# Patient Record
Sex: MALE | Race: WHITE | NOT HISPANIC OR LATINO | Employment: FULL TIME | URBAN - METROPOLITAN AREA
[De-identification: names, ages, dates, MRNs, and addresses within clinical notes are randomized per-mention and may not be internally consistent; named-entity substitution may affect disease eponyms.]

---

## 2018-09-18 ENCOUNTER — TRANSCRIBE ORDERS (OUTPATIENT)
Dept: ADMINISTRATIVE | Facility: HOSPITAL | Age: 23
End: 2018-09-18

## 2018-09-18 DIAGNOSIS — K76.9 LESION OF RIGHT LOBE OF LIVER: Primary | ICD-10-CM

## 2018-10-01 ENCOUNTER — HOSPITAL ENCOUNTER (OUTPATIENT)
Dept: MRI IMAGING | Facility: HOSPITAL | Age: 23
Discharge: HOME | End: 2018-10-01
Admitting: TRANSPLANT SURGERY

## 2018-10-01 DIAGNOSIS — K76.9 LESION OF RIGHT LOBE OF LIVER: ICD-10-CM

## 2018-10-01 PROCEDURE — A9577 INJ MULTIHANCE: HCPCS | Performed by: TRANSPLANT SURGERY

## 2018-10-01 PROCEDURE — 0 GADOBENATE DIMEGLUMINE 529 MG/ML SOLUTION: Performed by: TRANSPLANT SURGERY

## 2018-10-01 PROCEDURE — 74183 MRI ABD W/O CNTR FLWD CNTR: CPT

## 2018-10-01 RX ADMIN — GADOBENATE DIMEGLUMINE 13 ML: 529 INJECTION, SOLUTION INTRAVENOUS at 10:17

## 2020-12-04 ENCOUNTER — TELEPHONE (OUTPATIENT)
Dept: OTOLARYNGOLOGY | Facility: CLINIC | Age: 25
End: 2020-12-04

## 2021-02-03 ENCOUNTER — OFFICE VISIT (OUTPATIENT)
Dept: OTOLARYNGOLOGY | Facility: CLINIC | Age: 26
End: 2021-02-03

## 2021-02-03 VITALS
HEART RATE: 81 BPM | WEIGHT: 255 LBS | DIASTOLIC BLOOD PRESSURE: 83 MMHG | TEMPERATURE: 97.7 F | SYSTOLIC BLOOD PRESSURE: 149 MMHG

## 2021-02-03 DIAGNOSIS — R09.82 POST-NASAL DRAINAGE: ICD-10-CM

## 2021-02-03 DIAGNOSIS — J30.89 NON-SEASONAL ALLERGIC RHINITIS DUE TO OTHER ALLERGIC TRIGGER: Primary | ICD-10-CM

## 2021-02-03 DIAGNOSIS — J34.3 NASAL TURBINATE HYPERTROPHY: ICD-10-CM

## 2021-02-03 DIAGNOSIS — K21.9 GASTROESOPHAGEAL REFLUX DISEASE WITHOUT ESOPHAGITIS: ICD-10-CM

## 2021-02-03 DIAGNOSIS — J34.2 ACQUIRED DEVIATED NASAL SEPTUM: ICD-10-CM

## 2021-02-03 PROCEDURE — 99204 OFFICE O/P NEW MOD 45 MIN: CPT | Performed by: OTOLARYNGOLOGY

## 2021-02-03 RX ORDER — MONTELUKAST SODIUM 10 MG/1
10 TABLET ORAL NIGHTLY
COMMUNITY

## 2021-02-03 RX ORDER — LORATADINE 10 MG/1
10 TABLET ORAL DAILY
COMMUNITY

## 2021-02-03 RX ORDER — OXYMETAZOLINE HYDROCHLORIDE 0.05 G/100ML
2 SPRAY NASAL 3 TIMES DAILY
Qty: 2 ML | Refills: 0 | Status: SHIPPED | OUTPATIENT
Start: 2021-02-03 | End: 2021-02-06

## 2021-02-03 RX ORDER — TRIAMCINOLONE ACETONIDE 55 UG/1
2 SPRAY, METERED NASAL 2 TIMES DAILY
Qty: 33.8 ML | Refills: 3 | Status: SHIPPED | OUTPATIENT
Start: 2021-02-03

## 2021-02-03 RX ORDER — PREDNISONE 10 MG/1
10 TABLET ORAL EVERY OTHER DAY
Qty: 30 TABLET | Refills: 1 | Status: SHIPPED | OUTPATIENT
Start: 2021-02-03

## 2021-02-03 NOTE — PROGRESS NOTES
[facility] [place] [place] [facility] [place]    Chief Complaint   Patient presents with   • Follow-up     ALLERGIC RHINITIS          HPI   Accompanied by:  No One  Mamadou KATIE Oviedo is a  25 y.o.  male who complains of thick post nasal drip. The symptoms are localized to the throat. The patient has had mild to moderate symptoms. The symptoms have been relatively constant for the last several years. The symptoms are aggravated by  drinking juice and anxiety. There has been mild improvement with singulair and claritin.  He has tried flonase and nasal antihistamines in the past which have not been helpful and caused epistaxis.  He denies any facial pressure, ear pressure, globus sensation or headaches.   He states symptoms present for 6 yrs. Allergy tested 2016. No shots.  Nasal steroids make him bleed.  Oral steroids- not help.  Medications- Nasal steroids, nasal antihistamine.  Worst symptom- feels like thick film in back of throat.  No trouble breathing.  EDUARDO- occasionally.  Smoke- none  Drink- occasionally.  Sinus infection- says he gets a sore throat, nose stopped up. Treated for sinus infection.      History     Last Reviewed by Kurt Barker Jr., MD on 2/3/2021 at  2:25 PM    Sections Reviewed    Medical, Family, Tobacco, Surgical      Problem list reviewed by Kurt Barker Jr., MD on 2/3/2021 at  2:25 PM  Medicines reviewed by Kurt Barker Jr., MD on 2/3/2021 at  2:25 PM  Allergies reviewed by Kurt Barker Jr., MD on 2/3/2021 at  2:25 PM         Vital Signs:   Temp:  [97.7 °F (36.5 °C)] 97.7 °F (36.5 °C)  Heart Rate:  [81] 81  BP: (149)/(83) 149/83    Physical Exam  HENT:      Head: Normocephalic.      Right Ear: Hearing, tympanic membrane, ear canal and external ear normal.      Left Ear: Hearing, tympanic membrane, ear canal and external ear normal.      Nose: Septal deviation (right superior), mucosal edema and congestion present.      Mouth/Throat:      Lips:  Pink.      Mouth: Mucous membranes are moist. No oral lesions.      Tongue: No lesions.      Palate: No mass.      Pharynx: Uvula midline. Posterior oropharyngeal erythema present.      Tonsils: 0 on the right. 0 on the left.   Neck:      Musculoskeletal: Full passive range of motion without pain, normal range of motion and neck supple.      Thyroid: No thyromegaly or thyroid tenderness.      Trachea: Trachea and phonation normal.   Lymphadenopathy:      Cervical: No cervical adenopathy.   Neurological:      Mental Status: He is alert.     NOSE INTERNAL:    Anterior rhinoscopy   NASALMUCOSA:    abnormal:        Bilateral- Bluish and boggy    NASAL PASSAGES:     abnormal   Left- Narrowed, Right- Partially obstructed by lower septal deviation   NASAL VALVE:     intact, good support and no nasal obstruction   SEPTUM:     mucosa abnormal   Bilateral- Bluish and boggy     deviation present:      deviated Right Low mild to moderate   INFERIOR TURBINATES:     abnormal  Bilateral- Enlarged, bluish, boggy         SnapShot   Notes   Encounters  Labs   Imaging   CatchFree   Rslt Rev   :23}    Result Review    RESULTS REVIEW:    I have reviewed the patients old records in the chart.      REFERRAL/PRE-AUTH MRN - SCAN - ENT Referral (12/04/2020)        Assessment:        Diagnosis Plan   1. Non-seasonal allergic rhinitis due to other allergic trigger      Partially treated   2. Gastroesophageal reflux disease without esophagitis      As possible cause for PND   3. Post-nasal drainage      Main symptom   4. Acquired deviated nasal septum      L to R low mod   5. Nasal turbinate hypertrophy      Bilateral IT                Plan:        Conservative management.  Patient appears to have a combination of both reflux and allergic rhinitis.  He has not had immunotherapy at this point in time.  I will begin to treat the reflux with an acids, diet, exercise.  I will treat the nose with Nasacort to see if this causes any bleeding at  all.  He will continue the Claritin.  I will add nasal saline.  I will see if prednisone every other day helps his symptom of thickness in the back of his throat.  If he does not resolve his symptoms, I will plan to get a CT scan to evaluate the sinuses.  I discussed this plan with the patient and will reassess for symptom resolution.  Claritin daily  Nasacort BID  Afrin x 3 days  Reflux precautions  Antacids   Pred 10 mg QOD  Flex scope for EDUARDO and AR next visit  New Medications Ordered This Visit   Medications   • oxymetazoline (AFRIN) 0.05 % nasal spray     Si sprays into the nostril(s) as directed by provider 3 (Three) Times a Day for 3 days. Use for 3 days then stop     Dispense:  2 mL     Refill:  0   • predniSONE (DELTASONE) 10 MG tablet     Sig: Take 1 tablet by mouth Every Other Day.     Dispense:  30 tablet     Refill:  1   • Triamcinolone Acetonide (NASACORT) 55 MCG/ACT nasal inhaler     Si sprays into the nostril(s) as directed by provider 2 (Two) Times a Day.     Dispense:  33.8 mL     Refill:  3          MY CHART:  Patient is Encouraged to enroll in My Chart  Encouraged to review data and findings in My Chart    Patient understand(s) and agree(s) with the treatment plan as described.    Return in about 6 weeks (around 3/17/2021) for Recheck Nose, flex scope.            Kurt Barker Jr, MD  21  14:39 CST

## 2021-02-03 NOTE — PATIENT INSTRUCTIONS
"THE PROBLEM OF ACID REFLUX    In BOTH children and adults, irritating acids may leak from the stomach and come up into the esophagus and throat. This can occur at any time, but occurs more commonly when lying down. When the acid touches the lining of the esophagus, there is irritation and muscle spasm, which can be felt up into the throat. Usually this is felt as \"heartburn\". When scarring of the esophagus occurs, the esophagus becomes less sensitive and the symptoms may only be in the throat.     Some of the symptoms that can occur with acid reflux into the throat include coughing, soreness, burning, hoarseness, throat clearing, excessive mucous, bad taste in the mouth, bad breath, a sensation of a lump in the throat, wheezing, post-nasal drip, choking spells, bleeding and nausea. In a small percentage of people, more serious problems may result, such as pneumonia, ulcers in the larynx (voice box), reduced mobility of the vocal cords and a pouch in the upper esophagus (diverticulum). In children, the symptoms may be different and include persistent vomiting, bleeding from the esophagus, respiratory problems, pneumonia, asthma, choking spells, swallowing problems and anemia. In some cases, unexplained fussiness and crying is due to reflux.     The following instructions are designed to help neutralize the stomach acid, reduce the production of the acid, promote emptying of the acid from the stomach into the intestines and prevent acid from coming up into the esophagus. You should adopt enough of these changes to alleviate your symptoms. If carrying out these suggestions produces no change, it is imperative that you return so that we can discuss further the problem. More testing may be required, as might medication. It is important to realize that the esophagus takes time to heal, so you should not expect results immediately.    Diet  Most often, the throat symptoms above are due to dietary abuses. Certain foods are " known to cause irritation, because they are acids themselves or cause excess production of stomach acid. These should be avoided, if possible. The following is a partial list of foods recognized as troublesome: coffee (even decaffeinated), tea chocolate, cola beverages, citrus beverages (such as 7-Up), caffeine containing beverages, alcohol, citrus fruits and juices, highly spiced foods, fatty foods, candies, nuts, mints, milk and milk products. Some of the worst offenders for promoting stomach acid are milk and beer.     Hard candies, gum, breath fresheners, throat lozenges, cough drops, mouthwashes, gargles, may actually irritate the throat directly (many cough drops and lozenges contain irritants such as oil of eucalyptus and menthol), and can also stimulate acid production directly.     Large amounts of liquid in the diet tend to promote reflux, because liquids tend to come back up more easily than solids.     Meals should be bland and consist of real food, trying to avoid recreational food. Multiple small meals, rather than one or two large meals helps prevent reflux by not stretching the stomach and increasing the time needed for digestion, as well increasing the amount of acid needed to digest the meal. If you are overweight, losing weight is tremendously helpful.     YOU SHOULD NOT EAT FOR AT LEAST TWO HOURS BEFORE RETIRING AND YOU SHOULD REMAIN SITTING OR STANDING FOR AT LEAST 45 MINUTES AFTER EACH MEAL. This promotes passage of food from the stomach into the intestine.    Posture  Body weight is a significant factor in promoting reflux. Losing weight is beneficial. Pregnancy will markedly increase the symptoms of heartburn and throat pain. You should avoid clothing that fits tightly across the mid-section, as this promotes squeezing of the abdominal contents upward. It is helpful to practice abdominal or diaphragmatic breathing, using the stomach to push out each breath rather than chest expansion. Avoid  slumping while sitting, and avoid stooping or straining.     For many, the reflux occurs at night while sleeping. This is the time acid production is the greatest and you are lying down, a position that promotes reflux, thus setting up the irritation that occurs the next morning. One of the most important things you can do is to elevate the head of the bed, in an effort to use gravity to keep the acid in the stomach. This is accomplished by placed blocks or bricks beneath the legs at the head of the bed, raising the head 6-10 inches. Do not make the head so high that you slide down. Pillows are not effective because they cause the body to curl, increasing abdominal pressure and it is difficult to remain upright on them. Additionally, pillows promote sleeping on the back, but it is far more desirable to sleep on the right side or on the stomach, as this allows gas to escape, while preventing the escape of acid material. Children and infants, who are refluxing, should sleep on their stomachs.  Exercise  Regular exercise is extremely beneficial in promoting good digestion and regularity. The abdominal muscles are toned, which aids in controlling acid problems. However, it is recommended that you not participate in vigorous activity immediately after eating. This causes pressure on an already full stomach, forcing acid up into the esophagus. Regular exercise is encouraged, prior to meals, or two hours after meals.  Antacids  Antacids should be used regularly, as they neutralize excess acid. Gelusil II, Mylanta II, Tums and Rolaids are popular brands. Gaviscon is not an antacid, but floats on top of the stomach acid, preventing esophageal irritation. Care should be used in administering large doses of antacids, especially in children. Many antacid preparations contain magnesium, which promotes frequent bowel movements, while antacids that contain aluminum can be constipating. Tums have a high calcium content that can be  used to some advantage in older women with reflux.     Antacid tablets are convenient, but the liquid form tends to work much more quickly. Also remember to check with your physician about interference with other medications. Antacids can slow the absorption of digitalis, Indocin, tetracyclines, fluorides and isoniazid from the intestines. Many medications require the presence of stomach acid to be absorbed.     Initially, antacids should be used 30 minutes after each meal, 2 hours after each meal and at bedtime. This maximizes the neutralization of the stomach acid. Antacids can be used more frequently if symptoms persist, but such extensive use needs to be reviewed with your physician.  Prescription Medications  If the above recommendations are not effectively resolving the symptoms, medications may be prescribed. These are usually in the form of acid production blockers, such as Tagamet, Zantac, Pepcid, or Axid or acid pump inhibitors like Prevacid, Nexium, Protonix or Prilosec. These drugs help stop acid production in the stomach. Medications such as Reglan can be used to promote stomach emptying.  Medications That Promote Reflux  Certain medications can promote acid reflux; either by relaxing the sphincter muscle that helps keeps stomach acid down, or increasing the acid production. These include progesterone (Provera, Ortho Novum, Ovral, other birth control pills), theophylline (Haseeb-Dur, etc.), anticholinergic (Donnatal, Scopolamine, Probanthine, Bentil, others), beta blockers (Inderal, Tenormin and Corgard), alpha blockers (Dibenzyline), dopamine, calcium channel blockers (Procardia, Cardizem, Isotin, Calan), aspirin, non-steroidal anti-inflammatory drugs (Motrin, Advil, Nuprin, Nalfon, Rufen, Indocin, Feldene, Clinoril and Tolectin). Vitamin C is an acid and can promote reflux. This is only a partial list and before stopping any prescription medication, you should check with your physician.    Goal  The  goal is to reduce the symptoms with the least number of lifestyle changes. A combination of the above suggestions is frequently prescribed to return you to normal as quickly as possible. However, this problem did not develop overnight and will not disappear rapidly. Therefore, developing a regimen will aid in controlling symptoms and keep you symptom free for a long period of time. Other alternatives exist, should these suggestions fail, and can be discussed with your physician.     To Summarize:  Watch your diet, avoid foods that cause acid reflux  Lose weight  Avoid tight fitting clothes  Adjust your posture, raise the head of the bed  Exercise regularly  Use antacids  Use prescription medication as directed  Avoid medications that promote reflux  Avoid behavior and eating habits that promote reflux of stomach acid     You are welcome to do a Google search on the topic of reflux and reflux diets. The internet has many useful resources.     Please remember, your success in controlling this condition depends on many factors including diet, exercise, medications and follow up. All are important and must be utilized to achieve success.     ANTACID USE:  Use antacids 30 mins after every meal, 2 hours after every meal and at Bedtime  Use Gaviscon Extra (best), Tums, Rolaids, etc  Over the counter  Use 2 tsp or 2 tabs as the dose  Remember that some of these products contain Calcium or Aluminum. If you have dietary or medical issues, please inform physician    Afrin use:  Use 2 puffs each nostril 3 times daily for 3 days only!!  Stop using after 3 days unless instructed to use longer    Nasal steroid use:  Using nasal steroids:  You will be prescribed one of the following nasal steroids: Flonase, Nasacort, Nasonex, Rhinocort, Qnasl, Zetonna  2 puffs each nostril 2 times daily  Start as soon as possible    Use Afrin first and wait 10 minutes to allow the nose to open. Then administer nasal steroids.    NASAL SALINE:  Use  2 puffs each nostril 4-6 times daily and more frequently if possible.  You can buy saline spray or you can make your own and use an old spray bottle to administer  Use a humidifier at bedside  Recipe for saline:  Water                                 1 quart  Salt (table)                        1 tablespoon  Gylcerin (or Marguerite Syrup)    1 teaspoon  Sodium bicarbonate           1 teaspoon  Sprays or Allison pots are recommended    Continue Claritin daily    Thank you for enrolling in [facility]. Please follow the instructions below to securely access your online medical record. [facility] allows you to send messages to your doctor, view your test results, renew your prescriptions, schedule appointments, and more.    How Do I Sign Up?  1. In your Internet browser, go to [email or web address]  2. Click on the Sign Up Now link in the New User? box.   3. Enter your [facility] Activation Code exactly as it appears below. You will not need to use this code after you have completed the sign-up process. If you do not sign up before the expiration date, you must request a new code.  [facility] Activation Code: [email or web address]  Expires: 3/5/2021  2:34 PM    4. Enter the last four digits of your Social Security Number and your Date of Birth as indicated and click Next. You will be taken to the next sign-up page.  5. Create a [facility] username. Think of one that is secure and easy to remember.  6. Create a [facility] password. You can change your password at any time.  7. Choose a security question, enter your answer, and click Next. This can be used to access [facility] if you forget your password.   8. Select your communication preference. Enter a valid e-mail address if you would like to receive e-mail notifications when new information is available in [facility].  9. Click Sign In. You can now view your medical record.     Additional Information  If you have questions, you can e-mail [email or web address], or call [phone] to talk  to our MyChart staff. Remember, MyChart is NOT to be used for urgent needs. For medical emergencies, dial 911.

## 2021-03-12 ENCOUNTER — TRANSCRIBE ORDERS (OUTPATIENT)
Dept: LAB | Facility: HOSPITAL | Age: 26
End: 2021-03-12